# Patient Record
Sex: FEMALE | Race: WHITE | NOT HISPANIC OR LATINO | Employment: FULL TIME | URBAN - METROPOLITAN AREA
[De-identification: names, ages, dates, MRNs, and addresses within clinical notes are randomized per-mention and may not be internally consistent; named-entity substitution may affect disease eponyms.]

---

## 2022-06-29 DIAGNOSIS — R05.9 COUGH: Primary | ICD-10-CM

## 2022-12-01 PROBLEM — E66.9 OBESITY (BMI 30.0-34.9): Status: ACTIVE | Noted: 2022-12-01

## 2023-10-16 PROBLEM — Z32.01 POSITIVE PREGNANCY TEST: Status: ACTIVE | Noted: 2023-10-16

## 2023-10-16 PROBLEM — O99.210 OBESITY AFFECTING PREGNANCY, ANTEPARTUM: Status: ACTIVE | Noted: 2023-10-16

## 2023-10-16 PROBLEM — N91.2 AMENORRHEA: Status: ACTIVE | Noted: 2023-10-16

## 2023-10-17 ENCOUNTER — SOCIAL WORK (OUTPATIENT)
Dept: ADMINISTRATIVE | Facility: OTHER | Age: 28
End: 2023-10-17

## 2023-10-17 NOTE — PROGRESS NOTES
SW received consult from  regarding pt's notification of pregnancy. SW can not do a notification of pregnancy on pt at this time because she does not have medicaid. Pt has applied for medicaid and is waiting on approved. No other needs identified at this time.    MADHURI Tai  Desk:[phone]  Fax:[phone]

## 2024-01-04 ENCOUNTER — PATIENT MESSAGE (OUTPATIENT)
Dept: ADMINISTRATIVE | Facility: OTHER | Age: 29
End: 2024-01-04

## 2024-03-26 PROBLEM — O09.899 RUBELLA NON-IMMUNE STATUS, ANTEPARTUM: Status: ACTIVE | Noted: 2024-03-26

## 2024-03-26 PROBLEM — O36.60X0 ANTEPARTUM EXCESSIVE FETAL GROWTH: Status: ACTIVE | Noted: 2024-03-26

## 2024-03-26 PROBLEM — Z28.39 RUBELLA NON-IMMUNE STATUS, ANTEPARTUM: Status: ACTIVE | Noted: 2024-03-26

## 2024-03-27 PROBLEM — Z32.01 POSITIVE PREGNANCY TEST: Status: RESOLVED | Noted: 2023-10-16 | Resolved: 2024-03-27

## 2024-03-27 PROBLEM — N91.2 AMENORRHEA: Status: RESOLVED | Noted: 2023-10-16 | Resolved: 2024-03-27

## 2024-03-28 PROBLEM — Z98.891 S/P PRIMARY LOW TRANSVERSE C-SECTION: Status: ACTIVE | Noted: 2024-03-28

## 2024-03-29 PROBLEM — O99.210 OBESITY AFFECTING PREGNANCY, ANTEPARTUM: Status: RESOLVED | Noted: 2023-10-16 | Resolved: 2024-03-29

## 2024-03-29 PROBLEM — O36.60X0 ANTEPARTUM EXCESSIVE FETAL GROWTH: Status: RESOLVED | Noted: 2024-03-26 | Resolved: 2024-03-29

## 2024-03-29 PROBLEM — D62 ACUTE BLOOD LOSS ANEMIA: Status: ACTIVE | Noted: 2024-03-29

## 2024-04-03 PROBLEM — Z98.891 S/P PRIMARY LOW TRANSVERSE C-SECTION: Status: RESOLVED | Noted: 2024-03-28 | Resolved: 2024-04-03

## 2024-04-03 PROBLEM — D62 ACUTE BLOOD LOSS ANEMIA: Status: RESOLVED | Noted: 2024-03-29 | Resolved: 2024-04-03

## 2024-04-25 PROBLEM — Z28.39 RUBELLA NON-IMMUNE STATUS, ANTEPARTUM: Status: RESOLVED | Noted: 2024-03-26 | Resolved: 2024-04-25

## 2024-04-25 PROBLEM — O09.899 RUBELLA NON-IMMUNE STATUS, ANTEPARTUM: Status: RESOLVED | Noted: 2024-03-26 | Resolved: 2024-04-25
